# Patient Record
Sex: FEMALE | URBAN - METROPOLITAN AREA
[De-identification: names, ages, dates, MRNs, and addresses within clinical notes are randomized per-mention and may not be internally consistent; named-entity substitution may affect disease eponyms.]

---

## 2023-01-16 ENCOUNTER — OFFICE VISIT (OUTPATIENT)
Dept: URBAN - METROPOLITAN AREA CLINIC 71 | Facility: CLINIC | Age: 73
End: 2023-01-16
Payer: MEDICARE

## 2023-01-16 DIAGNOSIS — H53.2 DOUBLE VISION: Primary | ICD-10-CM

## 2023-01-16 DIAGNOSIS — H35.373 PUCKERING OF MACULA, BILATERAL: ICD-10-CM

## 2023-01-16 DIAGNOSIS — Z96.1 PRESENCE OF INTRAOCULAR LENS: ICD-10-CM

## 2023-01-16 DIAGNOSIS — H40.1131 PRIMARY OPEN-ANGLE GLAUCOMA, BILATERAL, MILD STAGE: ICD-10-CM

## 2023-01-16 DIAGNOSIS — H43.813 VITREOUS DEGENERATION, BILATERAL: ICD-10-CM

## 2023-01-16 PROCEDURE — 92134 CPTRZ OPH DX IMG PST SGM RTA: CPT | Performed by: OPHTHALMOLOGY

## 2023-01-16 PROCEDURE — 99203 OFFICE O/P NEW LOW 30 MIN: CPT | Performed by: OPHTHALMOLOGY

## 2023-01-16 ASSESSMENT — INTRAOCULAR PRESSURE
OD: 13
OS: 14

## 2023-01-16 NOTE — IMPRESSION/PLAN
Impression: Primary open-angle glaucoma, bilateral, mild stage: H40.1131. Plan: Continue Latanoprost QHS PU, recommend continuing visits with Dr Kymberly Huff.

## 2023-01-16 NOTE — IMPRESSION/PLAN
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 Plan: 8 PD esoptropia. Recommend doing a refraction recheck with an optom to see what the issue is with the newer pair. Will have her follow up with Dr Adri Lopez or Dr Shayne Mendoza.

## 2023-01-23 ENCOUNTER — TESTING ONLY (OUTPATIENT)
Dept: URBAN - METROPOLITAN AREA CLINIC 71 | Facility: CLINIC | Age: 73
End: 2023-01-23
Payer: MEDICARE

## 2023-01-23 DIAGNOSIS — H53.2 DOUBLE VISION: ICD-10-CM

## 2023-01-23 DIAGNOSIS — G70.00 MYASTHENIA GRAVIS: Primary | ICD-10-CM

## 2023-01-23 DIAGNOSIS — H52.4 PRESBYOPIA: ICD-10-CM

## 2023-01-23 DIAGNOSIS — H50.012 MONOCULAR ESOTROPIA, LEFT EYE: ICD-10-CM

## 2023-01-23 PROCEDURE — 99213 OFFICE O/P EST LOW 20 MIN: CPT

## 2023-01-23 ASSESSMENT — INTRAOCULAR PRESSURE
OS: 16
OD: 15

## 2023-01-23 ASSESSMENT — VISUAL ACUITY
OD: 20/20
OS: 20/30

## 2023-10-13 NOTE — IMPRESSION/PLAN
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 Plan: Most likely attributed to large magnitude esotropia OS, however patient states that diplopia worsens with fatigue & at the end of the day. This was observed in serial VG testing, in which magnitude of NARAYAN prism/ET worsened with each new attempt. Discussed testing for MG first, re-consider updating prism after testing completed to r/o MG. Most likely keep prism the same magnitude as patient was able to initially appreciate single & clear image with habitual prism, & demonstrated worsening/increasing prism amount with continued testing.
Impression: Monocular esotropia, left eye: H50.012.  Plan: Longstanding, obsv
Impression: Myasthenia gravis: G70.00. Plan: Suspect Myasthenia Gravis d/t symptoms of diplopia. fatigue, & difficulty swallowing which worsen towards the end of the day or when patient feels tired. Additionally she reports sensations of tingling or numbness in her limbs at times. Discussed suspected condition & patient's associated symptoms in detail. Ordered blood w/u for myasthenia gravis. If confirmed, will send letter to PCP detailing results of exam & lab results. Patient verbalizes understanding.
Impression: Presbyopia: H52.4.  Plan: Hold onto spec Rx until after testing to r/o MG, consider keeping prism magnitude same as habitual; refer to above plans for more details
Review pended refill request as it does not fall under a protocol.       Requested Prescriptions     Pending Prescriptions Disp Refills   • ERGOCALCIFEROL 1.25 MG (71235 UT) Oral Cap [Pharmacy Med Name: Vitamin D 50,000 Unit Cap Stri] 12 capsule 0     Sig:
0
Carac Counseling:  I discussed with the patient the risks of Carac including but not limited to erythema, scaling, itching, weeping, crusting, and pain.